# Patient Record
Sex: FEMALE
[De-identification: names, ages, dates, MRNs, and addresses within clinical notes are randomized per-mention and may not be internally consistent; named-entity substitution may affect disease eponyms.]

---

## 2018-02-28 ENCOUNTER — HOSPITAL ENCOUNTER (OUTPATIENT)
Dept: HOSPITAL 31 - C.SPRAD | Age: 38
Discharge: HOME | End: 2018-02-28
Attending: RADIOLOGY
Payer: SELF-PAY

## 2018-02-28 VITALS — RESPIRATION RATE: 18 BRPM

## 2018-02-28 VITALS
HEART RATE: 80 BPM | DIASTOLIC BLOOD PRESSURE: 62 MMHG | SYSTOLIC BLOOD PRESSURE: 110 MMHG | OXYGEN SATURATION: 100 % | TEMPERATURE: 97 F

## 2018-02-28 DIAGNOSIS — E04.1: Primary | ICD-10-CM

## 2018-02-28 LAB — GLUCOSE BLD-MCNC: 141 MG/DL (ref 65–110)

## 2018-02-28 NOTE — PCM.[email or web address]
Surgeon's Initial Post Op Note





- Surgeon's Notes


Surgeon: Sumeet Rocha MD


Assistant: none


Type of Anesthesia: Local


Pre-Operative Diagnosis: right thyroid nodule


Operative Findings: Large 4.6 cm right thyroid mixed solid and cystic right 

lower pole thyroid nodule


Post-Operative Diagnosis: right thyroid nodule


Operation Performed: US guided FNA


Specimen/Specimens Removed: 5 pases with a 25 g needle


Estimated Blood Loss: EBL {In ML}: 1


Blood Products Given: N/A


Drains Used: No Drains


Post-Op Condition: Good


Date of Surgery/Procedure: 02/28/18


Time of Surgery/Procedure: 11:05

## 2018-02-28 NOTE — CP.SDSHP
Same Day Surgery H & P





- History


Proposed Procedure: US guided FNA of right thyroid nodule


Pre-Op Diagnosis: right thyroid nodule





- Allergies


Allergies: 


Allergies





No Known Allergies Allergy (Verified 02/28/18 09:49)


 











- Physical Exam


Vital Signs: 


 Vital Signs











  02/28/18 02/28/18





  09:52 11:20


 


Temperature 97.6 F 97 F L


 


Pulse Rate 89 80


 


Respiratory 18 18





Rate  


 


Blood Pressure 128/74 110/62


 


O2 Sat by Pulse 98 100





Oximetry  














- Impression


Impression: Pt with a large right thyroid nodule. Plan US guided FNA.


Pt. Evaluated Today:Candidate for Anesthesia & Procedure: No





Short Stay Discharge





- Short Stay Discharge


Admitting Diagnosis/Reason for Visit: NONTOXIC SINGLE THYROID NODULE